# Patient Record
Sex: MALE | Race: WHITE | NOT HISPANIC OR LATINO
[De-identification: names, ages, dates, MRNs, and addresses within clinical notes are randomized per-mention and may not be internally consistent; named-entity substitution may affect disease eponyms.]

---

## 2020-04-04 ENCOUNTER — TRANSCRIPTION ENCOUNTER (OUTPATIENT)
Age: 35
End: 2020-04-04

## 2022-08-22 PROBLEM — Z00.00 ENCOUNTER FOR PREVENTIVE HEALTH EXAMINATION: Status: ACTIVE | Noted: 2022-08-22

## 2022-08-26 ENCOUNTER — APPOINTMENT (OUTPATIENT)
Dept: NEUROLOGY | Facility: CLINIC | Age: 37
End: 2022-08-26

## 2023-01-25 ENCOUNTER — APPOINTMENT (OUTPATIENT)
Dept: RADIOLOGY | Facility: CLINIC | Age: 38
End: 2023-01-25
Payer: COMMERCIAL

## 2023-01-25 ENCOUNTER — APPOINTMENT (OUTPATIENT)
Dept: NEUROLOGY | Facility: CLINIC | Age: 38
End: 2023-01-25
Payer: COMMERCIAL

## 2023-01-25 VITALS
WEIGHT: 205 LBS | SYSTOLIC BLOOD PRESSURE: 115 MMHG | BODY MASS INDEX: 28.7 KG/M2 | HEART RATE: 82 BPM | HEIGHT: 71 IN | DIASTOLIC BLOOD PRESSURE: 73 MMHG

## 2023-01-25 PROCEDURE — 99204 OFFICE O/P NEW MOD 45 MIN: CPT

## 2023-01-25 PROCEDURE — 72050 X-RAY EXAM NECK SPINE 4/5VWS: CPT

## 2023-02-09 ENCOUNTER — APPOINTMENT (OUTPATIENT)
Dept: MRI IMAGING | Facility: CLINIC | Age: 38
End: 2023-02-09
Payer: COMMERCIAL

## 2023-02-09 PROCEDURE — 70551 MRI BRAIN STEM W/O DYE: CPT

## 2023-03-02 ENCOUNTER — APPOINTMENT (OUTPATIENT)
Dept: NEUROLOGY | Facility: CLINIC | Age: 38
End: 2023-03-02
Payer: COMMERCIAL

## 2023-03-02 VITALS
DIASTOLIC BLOOD PRESSURE: 82 MMHG | BODY MASS INDEX: 28.7 KG/M2 | HEIGHT: 71 IN | WEIGHT: 205 LBS | SYSTOLIC BLOOD PRESSURE: 134 MMHG | HEART RATE: 78 BPM

## 2023-03-02 DIAGNOSIS — M54.2 CERVICALGIA: ICD-10-CM

## 2023-03-02 DIAGNOSIS — R51.9 HEADACHE, UNSPECIFIED: ICD-10-CM

## 2023-03-02 PROCEDURE — 99213 OFFICE O/P EST LOW 20 MIN: CPT

## 2023-03-02 NOTE — ASSESSMENT
[FreeTextEntry1] : Follow Up / Cervicalgia. \par \par - Physical Therapy. \par - I Will Follow Up with him in One Month. \par \par \par \par \par \par I, Dixie Delatorre, Attest that this documentation has been prepared under the direction and in the presence of Provider Jose Juan Rosado DO\par \par Thank You for letting me assist in the management of this patient. \par \par Jose Juan Rosado DO\par Board Certified, Neurology\par

## 2023-03-02 NOTE — HISTORY OF PRESENT ILLNESS
[FreeTextEntry1] : Mr. Mckeon is a 37 year old man who comes in for a follow up for his imaging results. He reports of unchanged since his last visit. His MRI of the Brain indicated no significant findings. He repots of having minor neck pain to date. His X-Ray was normal to date. He will go for sessions of Physical Therapy. \par \par \par \par \par \par \par \par \par \par \par \par \par \par Mr. Mckeon is a 37 year old man who comes in for constant Headaches. He is constant headaches have been progressing. He has one cup of pre workout supplement. On days without any caffeine he has frequent sharper headaches. \par

## 2023-03-02 NOTE — ASSESSMENT
[FreeTextEntry1] : Headaches-possiblity cervicogenic\par \par - Physical Therapy. \par - MRI of the cervical Spine W/o Contrast. \par - MRI of the Brain W/o Contrast. \par - X-Ray Of the Neck.\par \par - I Will follow up with him in one month. \par \par \par \par I, Dixie Delatorre, Attest that this documentation has been prepared under the direction and in the presence of Provider Jose Juan Rosado DO\par \par Thank You for letting me assist in the management of this patient. \par \par Jose Juan Rosado DO\par Board Certified, Neurology\par

## 2023-03-02 NOTE — HISTORY OF PRESENT ILLNESS
[FreeTextEntry1] : Mr. Mckeon is a 37 year old man who comes in today for constant Headaches. He is constant headaches have been progressing over the course of 2 years. he priest stake Advil as needed. He does deal with the pain as much as he can. He describes the headaches to be a Squeezing, Tightening sensation. He does take over the counter gym supplements.  He states he does get blurry vision at times. He does have tight tension in his neck as well. He does drink one cu of coffee a day. He also has one cup of pre workout supplement a day as well which he switches on and off from. He says on days without any caffeine he has more  frequent, sharper headaches. \par

## 2023-05-01 ENCOUNTER — APPOINTMENT (OUTPATIENT)
Dept: NEUROLOGY | Facility: CLINIC | Age: 38
End: 2023-05-01

## 2024-04-01 ENCOUNTER — APPOINTMENT (OUTPATIENT)
Dept: ORTHOPEDIC SURGERY | Facility: CLINIC | Age: 39
End: 2024-04-01
Payer: OTHER MISCELLANEOUS

## 2024-04-01 VITALS — WEIGHT: 210 LBS | BODY MASS INDEX: 29.4 KG/M2 | HEIGHT: 71 IN

## 2024-04-01 DIAGNOSIS — Z78.9 OTHER SPECIFIED HEALTH STATUS: ICD-10-CM

## 2024-04-01 PROCEDURE — 73130 X-RAY EXAM OF HAND: CPT | Mod: RT

## 2024-04-01 PROCEDURE — 99203 OFFICE O/P NEW LOW 30 MIN: CPT | Mod: ACP

## 2024-04-01 NOTE — IMAGING
[de-identified] : On examination of his right hand he has mild swelling.  No erythema, no ecchymosis.  He has some mild tenderness palpation over the fifth MCP joint.  Mild pain between the fourth and fifth metacarpals.  He has pain with compression and squeezing of the metacarpals.  He has no pain when making a fist.  He is able to fully open and close his fist.  No tenderness over the snuffbox distal radius or the distal ulna.  No point tenderness to palpation over the fingers.  Sensation is intact throughout, 2+ radial pulse.  X-rays taken in the office today of the right hand and CT scan report reviewed showing degenerative changes at the base of the fifth metacarpal and the fifth CMC joint.  No acute fractures or other bony abnormalities noted.

## 2024-04-01 NOTE — DISCUSSION/SUMMARY
[de-identified] : At this time I recommend he rest the hand, alternate between ice and warm compresses or warm soaks.  Anti-inflammatories as needed.  Will see him back in 2 weeks for repeat evaluation if the pain is not improving. Patient will call me if any other problems or concerns.  Patient verbalized understanding and agreed with the plan, all questions were answered in the office today.  This is a Worker's Compensation case, he is unable to work at this time.  He is currently on temporary total disability.

## 2024-04-01 NOTE — HISTORY OF PRESENT ILLNESS
[de-identified] : 38-year-old male is here today for evaluation of his right hand.  Patient is [employer].  He states he was working on Friday, he states they were taking down a door and his hand got caught between the door and the machine they were using.  He states after that he was having a lot of pain.  He went to the hospital, [facility], and had a CT scan done and was told there was no fracture.  He was advised to follow-up with orthopedics.  He states it mostly hurts with gripping and when someone squeezes or shakes his hand.  He denies any previous injuries to this hand.  He denies any numbness or tingling in the hand or fingers.

## 2024-04-15 ENCOUNTER — APPOINTMENT (OUTPATIENT)
Dept: ORTHOPEDIC SURGERY | Facility: CLINIC | Age: 39
End: 2024-04-15
Payer: OTHER MISCELLANEOUS

## 2024-04-15 DIAGNOSIS — S60.221A CONTUSION OF RIGHT HAND, INITIAL ENCOUNTER: ICD-10-CM

## 2024-04-15 PROCEDURE — 99202 OFFICE O/P NEW SF 15 MIN: CPT

## 2024-04-15 NOTE — HISTORY OF PRESENT ILLNESS
[de-identified] : 38-year-old male right hand injury at work as a .  He currently is not working.  Comes in today for evaluation.  He still complains about pain discomfort on the ulnar aspect of the right hand.  Patient had a crushing injury at work.

## 2024-04-15 NOTE — PHYSICAL EXAM
[de-identified] : Patient has minimal tenderness to palpation along the base of the fifth metacarpal.  There is no rotational abnormality.  There is no erythema ecchymoses or abrasions.  No masses.

## 2024-04-15 NOTE — DATA REVIEWED
[FreeTextEntry1] : Previous radiographs are reviewed documenting degenerative changes at the base of the fifth metacarpal CMC joint

## 2024-04-15 NOTE — ASSESSMENT
[FreeTextEntry1] : Patient a contusion to the right hand.  This is healed.  He will return back to normal work activities.  He will see me back on an as needed basis.  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